# Patient Record
Sex: MALE
[De-identification: names, ages, dates, MRNs, and addresses within clinical notes are randomized per-mention and may not be internally consistent; named-entity substitution may affect disease eponyms.]

---

## 2017-09-21 ENCOUNTER — HOSPITAL ENCOUNTER (EMERGENCY)
Dept: HOSPITAL 31 - C.ER | Age: 27
Discharge: HOME | End: 2017-09-21
Payer: COMMERCIAL

## 2017-09-21 VITALS
RESPIRATION RATE: 16 BRPM | DIASTOLIC BLOOD PRESSURE: 64 MMHG | OXYGEN SATURATION: 100 % | SYSTOLIC BLOOD PRESSURE: 120 MMHG | TEMPERATURE: 98.5 F | HEART RATE: 75 BPM

## 2017-09-21 DIAGNOSIS — B35.3: Primary | ICD-10-CM

## 2017-09-21 NOTE — C.PDOC
History Of Present Illness


Patient complains of rash to both feet for few days, and 2 days ago scratched 

and picked at right foot and now complains of pain to area. He reports rash is 

itchy and at times burns, denies any fever or drainage. 


Time Seen by Provider: 09/21/17 11:37


Chief Complaint (Nursing): Abnormal Skin Integrity


History Per: Patient


History/Exam Limitations: no limitations


Onset/Duration Of Symptoms: Days


Current Symptoms Are (Timing): Still Present


Location Of Injury: Right: Foot, Left: Foot


Quality Of Symptoms: Painful, Itching.  denies: Swollen, Draining





Past Medical History


Reviewed: Historical Data, Nursing Documentation, Vital Signs


Vital Signs: 


 Last Vital Signs











Temp  98.5 F   09/21/17 11:21


 


Pulse  75   09/21/17 11:21


 


Resp  16   09/21/17 11:21


 


BP  120/64   09/21/17 11:21


 


Pulse Ox  100   09/21/17 12:05














- Medical History


PMH: No Chronic Diseases


Surgical History: No Surg Hx


Family History: States: Unknown Family Hx





- Social History


Hx Alcohol Use: Yes


Hx Substance Use: No





- Immunization History


Hx Tetanus Toxoid Vaccination: No


Hx Influenza Vaccination: No


Hx Pneumococcal Vaccination: No





Review Of Systems


Except As Marked, All Systems Reviewed And Found Negative.


Skin: Positive for: Rash





Physical Exam





- Physical Exam


Appears: Non-toxic, No Acute Distress


Skin: Warm, Dry, Rash (dry flaking and scaly rash to bilateral plantar surface. 

scab wound to right foot. )


Head: Atraumatic, Normacephalic


Eye(s): bilateral: Normal Inspection


Neck: Normal ROM


Chest: Symmetrical


Extremity: Normal ROM, No Tenderness, No Calf Tenderness, No Deformity, No 

Swelling


Pulses: Left Dorsalis Pedis: Normal, Right Dorsalis Pedis: Normal


Neurological/Psych: Oriented x3, Normal Speech





ED Course And Treatment


O2 Sat by Pulse Oximetry: 100





Medical Decision Making


Medical Decision Making: 


impression: tinea pedis


1153 spoke with podiatry resident who recommends prophylactic antibiotic and 

can follow up in the podiatry clinic on Monday. 


Bacitracin applied and Ibuprofen given for pain. Patient given verbal 

instructions for follow up.








Disposition


Counseled Patient/Family Regarding: Diagnosis, Need For Followup, Rx Given





- Disposition


Referrals: 


Podiatry Clinic [Outside]


Disposition: HOME/ ROUTINE


Disposition Time: 11:58


Condition: STABLE


Additional Instructions: 


Apply foot cream twice daily for 1-3 weeks. Take antibiotic twice a day. Follow 

up in the podiatry clinic on Monday


Prescriptions: 


Cephalexin [cephalexin] 500 mg PO Q12 #10 cap


Terbinafine HCl [Lamisil At] 30 gm TP BID #1 cream..g. NS


Instructions:  Tinea Pedis (ED)





- POA


Present On Arrival: None





- Clinical Impression


Clinical Impression: 


 Tinea pedis

## 2018-05-04 ENCOUNTER — HOSPITAL ENCOUNTER (EMERGENCY)
Dept: HOSPITAL 31 - C.ER | Age: 28
Discharge: HOME | End: 2018-05-04
Payer: COMMERCIAL

## 2018-05-04 VITALS
TEMPERATURE: 97.9 F | DIASTOLIC BLOOD PRESSURE: 76 MMHG | RESPIRATION RATE: 16 BRPM | HEART RATE: 50 BPM | OXYGEN SATURATION: 100 % | SYSTOLIC BLOOD PRESSURE: 129 MMHG
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
## 2018-05-04 DIAGNOSIS — Z23: ICD-10-CM

## 2018-05-04 DIAGNOSIS — S01.01XA: Primary | ICD-10-CM

## 2018-05-04 NOTE — C.PDOC
History Of Present Illness


28 year old male presents to the ED for evaluation of a laceration to the top 

of his scalp. Patient states that while at work patient cut the top of his 

scalp with the corner of a wooden box. Patient denies LOC, headache, visual 

changes, weakness, numbness. 


Time Seen by Provider: 05/04/18 13:34


Chief Complaint (Nursing): Abnormal Skin Integrity


History Per: Patient


History/Exam Limitations: no limitations


Onset/Duration Of Symptoms: Hrs


Current Symptoms Are (Timing): Still Present


Location Of Injury: Right: Head, Left: Head


Quality Of Symptoms: Painful


Recent travel outside of the [place]: No


Additional History Per: Patient





Past Medical History


Reviewed: Historical Data, Nursing Documentation, Vital Signs


Vital Signs: 


 Last Vital Signs











Temp  97.9 F   05/04/18 13:27


 


Pulse  50 L  05/04/18 13:27


 


Resp  16   05/04/18 13:27


 


BP  129/76   05/04/18 13:27


 


Pulse Ox  100   05/04/18 14:12














- Medical History


PMH: No Chronic Diseases


Surgical History: No Surg Hx


Family History: States: Unknown Family Hx





- Social History


Hx Alcohol Use: Yes


Hx Substance Use: No





- Immunization History


Hx Tetanus Toxoid Vaccination: No


Hx Influenza Vaccination: No


Hx Pneumococcal Vaccination: No





Review Of Systems


Except As Marked, All Systems Reviewed And Found Negative.


Eyes: Negative for: Vision Change


Skin: Positive for: Other (Laceration )





Physical Exam





- Physical Exam


Appears: Non-toxic, No Acute Distress


Skin: Normal Color, Warm, Dry


Head: Normacephalic, Laceration (2 cm to the top of his scalp)


Eye(s): bilateral: Normal Inspection, PERRL, EOMI


Nose: No Discharge


Oral Mucosa: Moist


Neck: Normal ROM, No Midline Cervical Tenderness, Supple


Chest: Symmetrical


Cardiovascular: Rhythm Regular, No Murmur


Extremity: Normal ROM


Neurological/Psych: Oriented x3, Normal Speech, Normal Cognition, Normal Motor, 

Normal Sensation


Gait: Steady





ED Course And Treatment


O2 Sat by Pulse Oximetry: 100 (ON RA)


Pulse Ox Interpretation: Normal





Laceration





- Laceration Repair


  ** top of scalp


Wound Length (In cm): 2


Description Of Wound: Linear


Anesthesia: Lidocaine 1%


Wound Examination: Irrigated With Saline, No FB With Wound Exploration


Wound Closure: Staples


Wound Complexity: Simple





Medical Decision Making


Medical Decision Making: 


Plan:


* tetanus UTD


* Lidocaine


3 staples placed 1 week f/u. 





Disposition





- Disposition


Referrals: 


[facility] [Outside]


[facility] [Outside]


Disposition: HOME/ ROUTINE


Disposition Time: 02:00


Condition: STABLE


Additional Instructions: 


return in 7 days or follow up with your doctor for removal. return immediately 

with any worsening symptoms or concerns. 


Instructions:  Laceration Repair With Staples (DC)


Forms:  [facility] (English)





- Clinical Impression


Clinical Impression: 


 Laceration








- Scribe Statement


The provider has reviewed the documentation as recorded by the Scribe


Rosendo Terry





All medical record entries made by the Scribe were at my direction and 

personally dictated by me. I have reviewed the chart and agree that the record 

accurately reflects my personal performance of the history, physical exam, 

medical decision making, and the department course for this patient. I have 

also personally directed, reviewed, and agree with the discharge instructions 

and disposition.

## 2018-05-15 ENCOUNTER — HOSPITAL ENCOUNTER (EMERGENCY)
Dept: HOSPITAL 31 - C.ER | Age: 28
Discharge: HOME | End: 2018-05-15
Payer: COMMERCIAL

## 2018-05-15 VITALS
TEMPERATURE: 97.7 F | SYSTOLIC BLOOD PRESSURE: 107 MMHG | RESPIRATION RATE: 20 BRPM | DIASTOLIC BLOOD PRESSURE: 61 MMHG | HEART RATE: 51 BPM | OXYGEN SATURATION: 96 %
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
## 2018-05-15 DIAGNOSIS — L03.011: ICD-10-CM

## 2018-05-15 DIAGNOSIS — S60.452A: Primary | ICD-10-CM

## 2018-05-15 PROCEDURE — 99284 EMERGENCY DEPT VISIT MOD MDM: CPT

## 2018-05-15 PROCEDURE — 73140 X-RAY EXAM OF FINGER(S): CPT

## 2018-05-15 PROCEDURE — 96365 THER/PROPH/DIAG IV INF INIT: CPT

## 2018-05-15 NOTE — RAD
PROCEDURE:  Right middle finger radiographs.



HISTORY:

infection



COMPARISON:

None.



TECHNIQUE:

AP radiograph of the right hand, as well as spot oblique and lateral 

images of right middle finger were obtained.



FINDINGS:



RIGHT MIDDLE FINGER:

Right middle finger normal, without fracture of focal lesion. 

Remainder of the right hand (as seen on the AP view) grossly 

unremarkable.



JOINTS:

Normal. 



SOFT TISSUES:

There is mild soft tissue swelling noted.  There is no radiopaque 

foreign body. 



OTHER FINDINGS:

None.



IMPRESSION:

No acute fracture. No evidence of osteomyelitis.

## 2018-05-15 NOTE — C.PDOC
History Of Present Illness


27yo male, presents to ED for evaluation of pain and swelling to his right 3rd 

digit since 2 days. Patient states 2 weeks ago, while buying lumber he got a 

splinter to his right middle finger; patient states he took the splinter out 

and was doing ok however, he noticed since the past 2 days he has had pain and 

swelling to that finger. He rates the pain 8/10 and reports using bacitracin 

ointment with no relief. Patient states the pain worsens with movement and 

palpation of finger; pain is non-radiating. He denies any fever, chills, 

weakness or numbness to the finger. No other medical complaints.





PMD: None


Time Seen by Provider: 05/15/18 09:07


Chief Complaint (Nursing): Finger,Hand,&Wrist


History Per: Patient


History/Exam Limitations: no limitations


Onset/Duration Of Symptoms: Days (2)


Current Symptoms Are (Timing): Still Present


Quality: Dull, "Pain"


Pain Scale Rating Of: 8


Exacerbating Factor(s): Strenuous Use Of Affected Area





Past Medical History


Reviewed: Historical Data, Nursing Documentation, Vital Signs


Vital Signs: 


 Last Vital Signs











Temp  97.7 F   05/15/18 10:26


 


Pulse  51 L  05/15/18 10:26


 


Resp  20   05/15/18 10:26


 


BP  107/61   05/15/18 10:26


 


Pulse Ox  96   05/15/18 11:57














- Medical History


PMH: No Chronic Diseases


Surgical History: No Surg Hx


Family History: States: No Known Family Hx, Unknown Family Hx





- Social History


Hx Alcohol Use: Yes


Hx Substance Use: No





- Immunization History


Hx Tetanus Toxoid Vaccination: No


Hx Influenza Vaccination: No


Hx Pneumococcal Vaccination: No





Review Of Systems


Except As Marked, All Systems Reviewed And Found Negative.


Constitutional: Negative for: Fever, Chills


Musculoskeletal: Positive for: Other (right 3rd digit pain and swelling)


Neurological: Negative for: Weakness, Numbness





Physical Exam





- Physical Exam


Appears: Non-toxic, No Acute Distress


Skin: Normal Color, Warm, Dry


Head: Atraumatic, Normacephalic


Eye(s): bilateral: Normal Inspection, PERRL


Neck: Supple


Chest: Symmetrical


Cardiovascular: Rhythm Regular


Respiratory: Normal Breath Sounds


Extremity: No Normal ROM (decreased ROM of right 3rd digit due to pain), 

Tenderness (right 3rd digit tender to palpation), Swelling (swelling noted to 

right 3rd digit), Other (erythema to tip of right 3rd digit)


Neurological/Psych: Oriented x3





ED Course And Treatment


O2 Sat by Pulse Oximetry: 96 (RA)


Pulse Ox Interpretation: Normal





Medical Decision Making


Medical Decision Making: 


Impression: Right 3rd digit pain and swelling


Plan:


-- XR Right 3rd digit


-- Motrin 600mg PO


-- Tylenol 975mg PO


-- Ancef 1g IV





Time: 0928


XR RIght 3rd digit FINDINGS:





RIGHT MIDDLE FINGER:


Right middle finger normal, without fracture of focal lesion. Remainder of the 

right hand (as seen on the AP view) grossly unremarkable.





JOINTS:


Normal. 





SOFT TISSUES:


There is mild soft tissue swelling noted.  There is no radiopaque foreign body. 





OTHER FINDINGS:


None.





IMPRESSION:


No acute fracture. No evidence of osteomyelitis.





Time: 1109


Patient reports improvement in pain and is stable for discharge home. Patient 

given prescription for antibiotics and told to follow up in clinic for a hand 

specialist referral.





Disposition


Counseled Patient/Family Regarding: Studies Performed, Diagnosis, Need For 

Followup, Rx Given





- Disposition


Referrals: 


Courtney Garrett MD [Staff Provider] - 


[facility] [Outside]


Disposition: HOME/ ROUTINE


Disposition Time: 11:09


Condition: STABLE


Prescriptions: 


Ibuprofen [Motrin] 600 mg PO TID #15 tab


Penicillin VK [Pen-Vee K] 2 tab PO BID #28 tab


traMADol/Acetaminophen [Ultracet 37.5/325 mg] 1 tab PO TID PRN #15 tab


 PRN Reason: pain


Forms:  [facility] (English)





- POA


Present On Arrival: None





- Clinical Impression


Clinical Impression: 


 Splinter in skin, Cellulitis of finger of right hand








- Scribe Statement


The provider has reviewed the documentation as recorded by the Scribe (Ana Maria Mcclellan)


Provider Attestation: 


All medical record entries made by the Scribe were at my direction and 

personally dictated by me. I have reviewed the chart and agree that the record 

accurately reflects my personal performance of the history, physical exam, 

medical decision making, and the department course for this patient. I have 

also personally directed, reviewed, and agree with the discharge instructions 

and disposition.

## 2018-05-17 ENCOUNTER — HOSPITAL ENCOUNTER (EMERGENCY)
Dept: HOSPITAL 31 - C.ER | Age: 28
Discharge: HOME | End: 2018-05-17
Payer: SELF-PAY

## 2018-05-17 VITALS
SYSTOLIC BLOOD PRESSURE: 114 MMHG | DIASTOLIC BLOOD PRESSURE: 64 MMHG | OXYGEN SATURATION: 98 % | RESPIRATION RATE: 20 BRPM | HEART RATE: 60 BPM | TEMPERATURE: 98.6 F

## 2018-05-17 DIAGNOSIS — M79.644: ICD-10-CM

## 2018-05-17 DIAGNOSIS — L03.011: Primary | ICD-10-CM

## 2018-05-17 NOTE — CT
EXAM:

  CT Right Upper Extremity Without Intravenous Contrast, Hand



EXAM DATE/TIME:

  5/17/2018 8:41 PM



CLINICAL HISTORY:

  28 years old, male; Signs and symptoms; Swelling; Fingers; Right; Additional 

info: Mod pain, swelling, puncture wound volar, r 3rd fing



TECHNIQUE:

  Axial computed tomography images of the right hand without intravenous 

contrast.  All CT scans at this facility use one or more dose reduction 

techniques, viz.: automated exposure control; ma/kV adjustment per patient size 

(including targeted exams where dose is matched to indication; i.e. head); or 

iterative reconstruction technique.

  Coronal and sagittal reformatted images were created and reviewed.



COMPARISON:

  No relevant prior studies available.



FINDINGS:

  BONES/JOINTS: No acute fractures visualized. Bony alignment is within normal 

limits.   No evidence of lytic, destructive bony changes or aggressive 

periosteal reaction to suggest osteomyelitis.

  SOFT TISSUES:  Diffuse soft tissue swelling and subcutaneous fat infiltration 

involving the middle finger soft tissues distally, at the level of the middle 

and distal phalanges.  No evidence of a focal, measurable soft tissue fluid 

collection/abscess.  No definite radioopaque and foreign bodies identified. No 

evidence of soft tissue hematoma.



IMPRESSION:     

- Soft tissue swelling involving the middle finger distally. 

- No evidence of focal abscess, radiopaque foreign body, or soft tissue gas.

- No acute bony abnormality identified.

- See above for remaining findings.

## 2018-05-17 NOTE — C.PDOC
History Of Present Illness


The patient is a 28 year old male who was evaluated in this ED on 5/15 for 

complaints of right finger pain and swelling s/p puncture wound injury which he 

sustained two weeks prior. Patient underwent XR which showed no evidence of 

foreign body, was prescribed antibiotics and advised to follow up with hand 

doctor. Patient states the pain has worsened today and presents for re-

evaluation. Patient denies fever, chills, or drainage from the area. 


Time Seen by Provider: 05/17/18 19:56


Chief Complaint (Nursing): Upper Extremity Problem/Injury


History Per: Patient


History/Exam Limitations: no limitations


Onset/Duration Of Symptoms: Days


Current Symptoms Are (Timing): Worse


Quality: "Pain"


Additional History Per: Patient





Past Medical History


Reviewed: Historical Data, Nursing Documentation, Vital Signs


Vital Signs: 


 Last Vital Signs











Temp  98.6 F   05/17/18 19:33


 


Pulse  60   05/17/18 19:33


 


Resp  20   05/17/18 19:33


 


BP  114/64   05/17/18 19:33


 


Pulse Ox  98   05/18/18 02:26














- Medical History


PMH: No Chronic Diseases


Surgical History: No Surg Hx


Family History: States: Unknown Family Hx





- Social History


Hx Alcohol Use: Yes


Hx Substance Use: No





- Immunization History


Hx Tetanus Toxoid Vaccination: Yes


Hx Influenza Vaccination: No


Hx Pneumococcal Vaccination: No





Review Of Systems


Constitutional: Negative for: Fever, Chills


Musculoskeletal: Positive for: Other (right finger pain and swelling. no 

drainage )





Physical Exam





- Physical Exam


Appears: Non-toxic, No Acute Distress


Skin: Normal Color, Warm, Dry, Other (small open wound to distal aspect of 

right 3rd DIP. no apparent fluctuance or purulent drainage noted. no erythema  )


Extremity: Tenderness (right 3rd finger ), Capillary Refill (less than 2 

seconds ), Swelling (right 3rd finger ), Other (ROM of finger intact, with pain 

)


Neurological/Psych: Oriented x3, Normal Speech, Normal Cognition, Normal Motor, 

Normal Sensation





ED Course And Treatment


O2 Sat by Pulse Oximetry: 98 (on RA)


Pulse Ox Interpretation: Normal





- CT Scan/US


  ** CT right upper extremity 


Other Rad Studies (CT/US): Read By Radiologist, Radiology Report Reviewed


CT/US Interpretation: EXAM:  CT Right Upper Extremity Without Intravenous 

Contrast, Hand.  EXAM DATE/TIME:  5/17/2018 8:41 PM.  CLINICAL HISTORY:  28 

years old, male; Signs and symptoms; Swelling; Fingers; Right; Additional info: 

Mod pain,.  swelling, puncture wound volar, r 3rd fing.  TECHNIQUE:  Axial 

computed tomography images of the right hand without intravenous contrast. All 

CT scans at.  this facility use one or more dose reduction techniques, viz.: 

automated exposure control; ma/kV.  adjustment per patient size (including 

targeted exams where dose is matched to indication; i.e. head);.  or iterative 

reconstruction technique.  Coronal and sagittal reformatted images were created 

and reviewed.  COMPARISON:  No relevant prior studies available.  FINDINGS:  

BONES/JOINTS: No acute fractures visualized. Bony alignment is within normal 

limits. No evidence.  of lytic, destructive bony changes or aggressive 

periosteal reaction to suggest osteomyelitis.  SOFT TISSUES: Diffuse soft 

tissue swelling and subcutaneous fat infiltration involving the middle.  finger 

soft tissues distally, at the level of the middle and distal phalanges. No 

evidence of a focal,.  measurable soft tissue fluid collection/abscess. No 

definite radioopaque and foreign bodies.  identified. No evidence of soft 

tissue hematoma.  IMPRESSION:  - Soft tissue swelling involving the middle 

finger distally.  - No evidence of focal abscess, radiopaque foreign body, or 

soft tissue gas.  - No acute bony abnormality identified.  - See above for 

remaining findings.


Progress Note: Based on patient's complaint of worsening pain since his 

previous visits, a CT of right upper extremity ordered in order to rule out 

abscess or tenosynovitis. Patient is refusing pain medication at this time.  On 

re-exam, patient is resting comfortably, showing no signs of distress and is 

stable for discharge. Patient is advised to continue antibiotics prescribed 

from his last visit and to follow up with hand surgeon on 6/5 as scheduled. 

Explained to patient that he must return if concerning symptoms arise, which 

include but are not limited to: fever, purulent discharge, numbness/weakness, 

or discoloration to the area. Patient verbalizes understanding and is stable 

for discharge.





Disposition


Counseled Patient/Family Regarding: Diagnosis, Need For Followup, Rx Given





- Disposition


Referrals: 


Courtney Garrett MD [Staff Provider] - 


Disposition: HOME/ ROUTINE


Disposition Time: 22:38


Condition: STABLE


Additional Instructions: 


Keep splint for support





Keep arm elvated





May apply warm compress/ and antibacterial oint





Keep hand clean/ wound care as instructed





Keep appointment with Hand surgeon





Continue antibiotics as prescribed





Return to ER if worse 


Instructions:  Cellulitis (Skin Infection), Adult (DC)


Forms:  [facility] (English)





- Clinical Impression


Clinical Impression: 


 Cellulitis of finger of right hand, Finger pain, right








- PA / NP / Resident Statement


MD/DO has reviewed & agrees with the documentation as recorded.





- Scribe Statement


The provider has reviewed the documentation as recorded by the Scribe (Susan Vazquez)








All medical record entries made by the Scribe were at my direction and 

personally dictated by me. I have reviewed the chart and agree that the record 

accurately reflects my personal performance of the history, physical exam, 

medical decision making, and the department course for this patient. I have 

also personally directed, reviewed, and agree with the discharge instructions 

and disposition.

## 2019-01-28 ENCOUNTER — HOSPITAL ENCOUNTER (EMERGENCY)
Dept: HOSPITAL 31 - C.ER | Age: 29
Discharge: HOME | End: 2019-01-28
Payer: COMMERCIAL

## 2019-01-28 ENCOUNTER — HOSPITAL ENCOUNTER (EMERGENCY)
Dept: HOSPITAL 14 - H.ER | Age: 29
LOS: 1 days | Discharge: HOME | End: 2019-01-29
Payer: COMMERCIAL

## 2019-01-28 VITALS
RESPIRATION RATE: 20 BRPM | OXYGEN SATURATION: 99 % | TEMPERATURE: 98 F | HEART RATE: 55 BPM | SYSTOLIC BLOOD PRESSURE: 122 MMHG | DIASTOLIC BLOOD PRESSURE: 72 MMHG

## 2019-01-28 VITALS — RESPIRATION RATE: 18 BRPM

## 2019-01-28 DIAGNOSIS — L02.413: Primary | ICD-10-CM

## 2019-01-28 DIAGNOSIS — L98.8: Primary | ICD-10-CM

## 2019-01-28 LAB
ALBUMIN SERPL-MCNC: 4.3 G/DL (ref 3.5–5)
ALBUMIN/GLOB SERPL: 1.3 {RATIO} (ref 1–2.1)
ALT SERPL-CCNC: 27 U/L (ref 21–72)
AST SERPL-CCNC: 25 U/L (ref 17–59)
BASOPHILS # BLD AUTO: 0 K/UL (ref 0–0.2)
BASOPHILS NFR BLD: 0.3 % (ref 0–2)
BUN SERPL-MCNC: 18 MG/DL (ref 9–20)
CALCIUM SERPL-MCNC: 9.6 MG/DL (ref 8.4–10.2)
EOSINOPHIL # BLD AUTO: 1.3 K/UL (ref 0–0.7)
EOSINOPHIL NFR BLD: 9.6 % (ref 0–4)
ERYTHROCYTE [DISTWIDTH] IN BLOOD BY AUTOMATED COUNT: 13.8 % (ref 11.5–14.5)
GFR NON-AFRICAN AMERICAN: > 60
HGB BLD-MCNC: 15.2 G/DL (ref 12–18)
LYMPHOCYTES # BLD AUTO: 1.7 K/UL (ref 1–4.3)
LYMPHOCYTES NFR BLD AUTO: 13.1 % (ref 20–40)
MCH RBC QN AUTO: 29.2 PG (ref 27–31)
MCHC RBC AUTO-ENTMCNC: 33.4 G/DL (ref 33–37)
MCV RBC AUTO: 87.3 FL (ref 80–94)
MONOCYTES # BLD: 0.9 K/UL (ref 0–0.8)
MONOCYTES NFR BLD: 7 % (ref 0–10)
NEUTROPHILS # BLD: 9.3 K/UL (ref 1.8–7)
NEUTROPHILS NFR BLD AUTO: 70 % (ref 50–75)
NRBC BLD AUTO-RTO: 0 % (ref 0–0)
PLATELET # BLD: 152 K/UL (ref 130–400)
PMV BLD AUTO: 10.3 FL (ref 7.2–11.7)
RBC # BLD AUTO: 5.21 MIL/UL (ref 4.4–5.9)
WBC # BLD AUTO: 13.3 K/UL (ref 4.8–10.8)

## 2019-01-28 PROCEDURE — 85025 COMPLETE CBC W/AUTO DIFF WBC: CPT

## 2019-01-28 PROCEDURE — 10160 PNXR ASPIR ABSC HMTMA BULLA: CPT

## 2019-01-28 PROCEDURE — 80053 COMPREHEN METABOLIC PANEL: CPT

## 2019-01-28 PROCEDURE — 87040 BLOOD CULTURE FOR BACTERIA: CPT

## 2019-01-28 PROCEDURE — 90715 TDAP VACCINE 7 YRS/> IM: CPT

## 2019-01-28 PROCEDURE — 90471 IMMUNIZATION ADMIN: CPT

## 2019-01-28 PROCEDURE — 99283 EMERGENCY DEPT VISIT LOW MDM: CPT

## 2019-01-28 PROCEDURE — 96374 THER/PROPH/DIAG INJ IV PUSH: CPT

## 2019-01-28 NOTE — ED PDOC
Upper Extremity Pain/Injury


Time Seen by Provider: 01/28/19 21:24


Chief Complaint (Nursing): Abnormal Skin Integrity


Chief Complaint (Provider): Pain to right forearm


History Per: Patient


History/Exam Limitations: no limitations


Onset/Duration Of Symptoms: Days


Current Symptoms Are (Timing): Still Present


Quality: "Pain"


Additional History Per: Patient


Additional Complaint(s): 


29yo male, states for the past 1 week, he has had a painful mass to his right 

forearm. He reports he was seen at [facility] earlier today for the same 

complaint and was prescribed antibiotics, which he has not taken. He reports 

coming to the ER as the swelling and redness around the wound has worsened. 

Patient states he has been taking Tylenol, with last dose yesterday and minimal 

relief. He otherwise denies any fever, chills and offers no additional 

complaints.





Past Medical History


Reviewed: Historical Data, Nursing Documentation, Vital Signs


Vital Signs: 





                                Last Vital Signs











Temp  98.2 F   01/28/19 20:22


 


Pulse  68   01/28/19 20:22


 


Resp  18   01/28/19 20:22


 


BP  114/60   01/28/19 20:22


 


Pulse Ox  99   01/28/19 20:22














- Medical History


PMH: No Chronic Diseases





- Surgical History


Surgical History: No Surg Hx





- Family History


Family History: States: No Known Family Hx





- Immunization History


Hx Tetanus Toxoid Vaccination: Yes


Hx Influenza Vaccination: No


Hx Pneumococcal Vaccination: No





- Home Medications


Home Medications: 


                                Ambulatory Orders











 Medication  Instructions  Recorded


 


Cephalexin [cephalexin] 500 mg PO Q6 #28 cap 01/28/19


 


Sulfamethoxazole/Trimethoprim 2 tab PO BID #28 tab 01/28/19





[Bactrim  mg-160 mg]  














- Allergies


Allergies/Adverse Reactions: 


                                    Allergies











Allergy/AdvReac Type Severity Reaction Status Date / Time


 


No Known Allergies Allergy   Verified 01/28/19 20:22














Review of Systems


ROS Statement: Except As Marked, All Systems Reviewed And Found Negative


Constitutional: Negative for: Fever, Chills


Musculoskeletal: Positive for: Other (painful mass to right forearm)





Physical Exam





- Reviewed


Nursing Documentation Reviewed: Yes


Vital Signs Reviewed: Yes





- Physical Exam


Appears: Positive for: Non-toxic, No Acute Distress


Head Exam: Positive for: NORMAL INSPECTION


Skin: Positive for: Normal Color


Neck: Positive for: Supple


Cardiovascular/Chest: Positive for: Regular Rate, Rhythm


Respiratory: Positive for: Normal Breath Sounds


Pulses-Radial (L): 2+


Pulses-Radial (R): 2+


Extremity: Positive for: Normal ROM (FROM bilateral upper extremities), Other 

(right forearm with a quarter sized, circular, indurated and non-fluctuant mass 

with moderate surrounding erythema. no streaking noted.)


Neurologic/Psych: Positive for: Alert, Oriented.  Negative for: Motor/Sensory 

Deficits





- Laboratory Results


Result Diagrams: 


                                 01/28/19 22:45





                                 01/28/19 22:45





- ECG


O2 Sat by Pulse Oximetry: 99 (RA)


Pulse Ox Interpretation: Normal





Medical Decision Making


Medical Decision Making: 


Impression: Abscess to right forearm


Plan:


-- Patient informed the abscess is still firm and an I&D is not indicated at 

this time


-- Labs


-- Blood culture


-- IV Vancomycin


-- IV Zosyn


-- IV toradol 





2200


Under sterile conditions mass was aspirated but no purulent material was 

produced. DSD applied. Bleeding controlled. I&D not indicated at this time. Pt. 

agrees with plan and care.





2300


On re-evaluation, pt. informed of results. States pain has improved. Instructed 

to solely use newly prescribed meds as Bactrim DS that was initially prescribed 

was not for MRSA dosing. Advised to return to ED in 48 hours for wound check but

is to return to ED immediately if symptoms worsen. Pt. and family verbalized 

correct understanding of necessary f/u. 





Scribe Attestation:


Documented by Maryann Bryant, acting as a scribe for JAMES Smith.





Provider Scribe Attestation:


All medical record entries made by the Scribe were at my direction and 

personally dictated by me. I have reviewed the chart and agree that the record 

accurately reflects my personal performance of the history, physical exam, 

medical decision making, and the department course for this patient. I have also

personally directed, reviewed, and agree with the discharge instructions and 

disposition.








Disposition





- Clinical Impression


Clinical Impression: 


 Cellulitis, Abscess








- Patient ED Disposition


Is Patient to be Admitted: No





- Disposition


Referrals: 


[facility] [Outside]


Disposition: Routine/Home


Disposition Time: 23:00


Condition: IMPROVED


Additional Instructions: 


ONLY TAKE THE MEDICATIONS I PRESCRIBED TO YOU


RETURN TO ED IN 48 HOURS FOR WOUND CHECK


RETURN TO ED IMMEDIATELY IF FEVER DEVELOPS OR REDNESS/SWELLING WORSENS





BRETT HODGES, thank you for letting us take care of you today. 

Your provider was Yulissa Thrasher MD and you were treated for RT ARM PAIN. The 

emergency medical care you received today was directed at your acute symptoms. 

If you were prescribed any medication, please fill it and take as directed. It 

may take several days for your symptoms to resolve. Return to the Emergency 

Department if your symptoms worsen, do not improve, or if you have any other 

problems.





Please contact your doctor or call one of the physicians/clinics you have been 

referred to that are listed on the Patient Visit Information form that is 

included in your discharge packet. Bring any paperwork you were given at [facility] with you along with any medications you are taking to your follow up 

visit. Our treatment cannot replace ongoing medical care by a primary care 

provider outside of the emergency department.





Thank you for allowing the [facility] team to be part of your care today.








If you had an X-Ray or CT scan: A Radiologist will review the ED reading if any 

change in treatment is needed we will contact you.***





If you had a blood, urine, or wound culture: It will take several days for the 

results, if any change in treatment is needed we will contact you.***





If you had an STI test: It will take 48 hours for the results. Please call after

 1 week if you have not heard back.***


Prescriptions: 


Cephalexin [cephalexin] 500 mg PO Q6 #28 cap


Sulfamethoxazole/Trimethoprim [Bactrim  mg-160 mg] 2 tab PO BID #28 tab


Instructions:  Boil (DC), Cellulitis (Skin Infection), Adult (DC)


Forms:  [facility] (English)

## 2019-01-28 NOTE — C.PDOC
History Of Present Illness


28 year old male presents to the ED for evaluation of a small lesion to right 

medial elbow area. Patient reports the area started as a small pustule for a few

days, and then erupted and started expressing serosanguinous fluid after he had 

been picking the area for a few days. Patient denies fever, chills, recent 

trauma. 


Time Seen by Provider: 01/28/19 13:22


Chief Complaint (Nursing): Abnormal Skin Integrity


History Per: Patient


History/Exam Limitations: no limitations


Onset/Duration Of Symptoms: Days


Current Symptoms Are (Timing): Still Present


Quality Of Symptoms: Draining


Additional History Per: Patient





Past Medical History


Vital Signs: 





                                Last Vital Signs











Temp  98 F   01/28/19 13:07


 


Pulse  55 L  01/28/19 13:07


 


Resp  20   01/28/19 13:07


 


BP  122/72   01/28/19 13:07


 


Pulse Ox  99   01/28/19 13:07











Family History: States: Unknown Family Hx





- Social History


Hx Alcohol Use: Yes


Hx Substance Use: No





- Immunization History


Hx Tetanus Toxoid Vaccination: Yes


Hx Influenza Vaccination: No


Hx Pneumococcal Vaccination: No





Review Of Systems


Constitutional: Negative for: Fever, Chills


Skin: Positive for: Lesions (one, right medial elbow area )





Physical Exam





- Physical Exam


Appears: Non-toxic, No Acute Distress


Skin: Normal Color, Warm, Dry, Other (small, 1x1cm area of questionable 

fluctuance with pus-like bloody discahrge. no surrounding erythema )


Head: Atraumatic, Normacephalic


Eye(s): bilateral: Normal Inspection


Extremity: Normal ROM, No Tenderness, Capillary Refill (less than 2 seconds ), 

No Swelling


Neurological/Psych: Oriented x3, Normal Speech, Normal Cognition





ED Course And Treatment


O2 Sat by Pulse Oximetry: 99 (on RA)


Pulse Ox Interpretation: Normal


Progress Note: Motrin PO and Bactrim PO given.





Medical Decision Making


Medical Decision Making: 





1x1 sm small abscess/wound, no opened to surface and draining


defer I&D for risk/benefit of further structural damage and minimal fluctuance.





Bactrim for consideration of ? MRSA








Disposition


Doctor Will See Patient In The: Office


Counseled Patient/Family Regarding: Studies Performed, Diagnosis





- Disposition


Referrals: 


[facility] [Outside]


[facility] [Outside]


[facility] [Outside]


Disposition: HOME/ ROUTINE


Disposition Time: 13:28


Condition: GOOD


Additional Instructions: 


compressas tibias 1/2 hora por hora


NO LO OPRIMAS, SE HACE PEOR








Bactrim DS (antibiotico) 1 tableta 2 veces al jamia por 5 john en total





Ibuprofeno/advil 400-600 mg cada 6 horas wilfredo necessario para dolor.





Sigue en la [facility] o' regressa wilfredo necessario


Prescriptions: 


Sulfamethoxazole/Trimethoprim [Bactrim  mg-160 mg] 1 tab PO BID #9 tab


Instructions:  Skin Abscess


Forms:  [employer] (English)


Print Language: Mexican





- Clinical Impression


Clinical Impression: 


 Skin lesion








- Scribe Statement


The provider has reviewed the documentation as recorded by the Scribe (Susan Vazquez)


Provider Attestation: 








All medical record entries made by the Scribe were at my direction and 

personally dictated by me. I have reviewed the chart and agree that the record 

accurately reflects my personal performance of the history, physical exam, 

medical decision making, and the department course for this patient. I have also

personally directed, reviewed, and agree with the discharge instructions and 

disposition.

## 2019-01-29 ENCOUNTER — HOSPITAL ENCOUNTER (OUTPATIENT)
Dept: HOSPITAL 14 - H.ER | Age: 29
Setting detail: OBSERVATION
LOS: 2 days | Discharge: HOME | End: 2019-01-31
Payer: COMMERCIAL

## 2019-01-29 VITALS
HEART RATE: 66 BPM | OXYGEN SATURATION: 100 % | SYSTOLIC BLOOD PRESSURE: 118 MMHG | TEMPERATURE: 98 F | DIASTOLIC BLOOD PRESSURE: 62 MMHG

## 2019-01-29 DIAGNOSIS — Z23: ICD-10-CM

## 2019-01-29 DIAGNOSIS — B95.61: ICD-10-CM

## 2019-01-29 DIAGNOSIS — L03.113: ICD-10-CM

## 2019-01-29 DIAGNOSIS — L02.413: Primary | ICD-10-CM

## 2019-01-29 DIAGNOSIS — F17.210: ICD-10-CM

## 2019-01-29 PROCEDURE — 90674 CCIIV4 VAC NO PRSV 0.5 ML IM: CPT

## 2019-01-29 PROCEDURE — 80053 COMPREHEN METABOLIC PANEL: CPT

## 2019-01-29 PROCEDURE — 90471 IMMUNIZATION ADMIN: CPT

## 2019-01-29 PROCEDURE — 99285 EMERGENCY DEPT VISIT HI MDM: CPT

## 2019-01-29 PROCEDURE — 82803 BLOOD GASES ANY COMBINATION: CPT

## 2019-01-29 PROCEDURE — 81003 URINALYSIS AUTO W/O SCOPE: CPT

## 2019-01-29 PROCEDURE — 85025 COMPLETE CBC W/AUTO DIFF WBC: CPT

## 2019-01-29 PROCEDURE — 10060 I&D ABSCESS SIMPLE/SINGLE: CPT

## 2019-01-29 PROCEDURE — 87086 URINE CULTURE/COLONY COUNT: CPT

## 2019-01-29 PROCEDURE — 87070 CULTURE OTHR SPECIMN AEROBIC: CPT

## 2019-01-29 PROCEDURE — 93005 ELECTROCARDIOGRAM TRACING: CPT

## 2019-01-29 PROCEDURE — 87040 BLOOD CULTURE FOR BACTERIA: CPT

## 2019-01-29 PROCEDURE — 83036 HEMOGLOBIN GLYCOSYLATED A1C: CPT

## 2019-01-29 PROCEDURE — 36415 COLL VENOUS BLD VENIPUNCTURE: CPT

## 2019-01-29 PROCEDURE — 71045 X-RAY EXAM CHEST 1 VIEW: CPT

## 2019-01-29 PROCEDURE — 85027 COMPLETE CBC AUTOMATED: CPT

## 2019-01-30 LAB
ALBUMIN SERPL-MCNC: 4.3 G/DL (ref 3.5–5)
ALBUMIN/GLOB SERPL: 1.3 {RATIO} (ref 1–2.1)
ALT SERPL-CCNC: 24 U/L (ref 21–72)
AST SERPL-CCNC: 25 U/L (ref 17–59)
BACTERIA #/AREA URNS HPF: (no result) /[HPF]
BASE EXCESS BLDV CALC-SCNC: -0.3 MMOL/L (ref 0–2)
BASE EXCESS BLDV CALC-SCNC: -2.9 MMOL/L (ref 0–2)
BASOPHILS # BLD AUTO: 0.1 K/UL (ref 0–0.2)
BASOPHILS NFR BLD: 0.5 % (ref 0–2)
BILIRUB UR-MCNC: NEGATIVE MG/DL
BUN SERPL-MCNC: 12 MG/DL (ref 9–20)
CALCIUM SERPL-MCNC: 9.7 MG/DL (ref 8.4–10.2)
COLOR UR: YELLOW
EOSINOPHIL # BLD AUTO: 0.7 K/UL (ref 0–0.7)
EOSINOPHIL NFR BLD: 6.2 % (ref 0–4)
ERYTHROCYTE [DISTWIDTH] IN BLOOD BY AUTOMATED COUNT: 13.6 % (ref 11.5–14.5)
ERYTHROCYTE [DISTWIDTH] IN BLOOD BY AUTOMATED COUNT: 13.9 % (ref 11.5–14.5)
GFR NON-AFRICAN AMERICAN: > 60
GLUCOSE UR STRIP-MCNC: (no result) MG/DL
HGB BLD-MCNC: 13.6 G/DL (ref 12–18)
HGB BLD-MCNC: 14.5 G/DL (ref 12–18)
LEUKOCYTE ESTERASE UR-ACNC: (no result) LEU/UL
LYMPHOCYTES # BLD AUTO: 1.2 K/UL (ref 1–4.3)
LYMPHOCYTES NFR BLD AUTO: 10.5 % (ref 20–40)
MCH RBC QN AUTO: 28.5 PG (ref 27–31)
MCH RBC QN AUTO: 28.9 PG (ref 27–31)
MCHC RBC AUTO-ENTMCNC: 33 G/DL (ref 33–37)
MCHC RBC AUTO-ENTMCNC: 33.2 G/DL (ref 33–37)
MCV RBC AUTO: 86.5 FL (ref 80–94)
MCV RBC AUTO: 87.1 FL (ref 80–94)
MONOCYTES # BLD: 1 K/UL (ref 0–0.8)
MONOCYTES NFR BLD: 8.5 % (ref 0–10)
NEUTROPHILS # BLD: 8.7 K/UL (ref 1.8–7)
NEUTROPHILS NFR BLD AUTO: 74.3 % (ref 50–75)
NRBC BLD AUTO-RTO: 0 % (ref 0–0)
PCO2 BLDV: 38 MMHG (ref 40–60)
PCO2 BLDV: 41 MMHG (ref 40–60)
PH BLDV: 7.35 [PH] (ref 7.32–7.43)
PH BLDV: 7.41 [PH] (ref 7.32–7.43)
PH UR STRIP: 6 [PH] (ref 5–8)
PLATELET # BLD: 144 K/UL (ref 130–400)
PLATELET # BLD: 146 K/UL (ref 130–400)
PMV BLD AUTO: 10.3 FL (ref 7.2–11.7)
PROT UR STRIP-MCNC: NEGATIVE MG/DL
RBC # BLD AUTO: 4.71 MIL/UL (ref 4.4–5.9)
RBC # BLD AUTO: 5.1 MIL/UL (ref 4.4–5.9)
RBC # UR STRIP: NEGATIVE /UL
SP GR UR STRIP: 1.01 (ref 1–1.03)
SQUAMOUS EPITHIAL: < 1 /HPF (ref 0–5)
URINE CLARITY: CLEAR
UROBILINOGEN UR-MCNC: 2 MG/DL (ref 0.2–1)
VENOUS BLOOD FIO2: 21 %
VENOUS BLOOD FIO2: 21 %
VENOUS BLOOD GAS PO2: 48 MM/HG (ref 30–55)
VENOUS BLOOD GAS PO2: 54 MM/HG (ref 30–55)
WBC # BLD AUTO: 11.7 K/UL (ref 4.8–10.8)
WBC # BLD AUTO: 9.7 K/UL (ref 4.8–10.8)

## 2019-01-30 RX ADMIN — WATER SCH MLS/HR: 1 INJECTION INTRAMUSCULAR; INTRAVENOUS; SUBCUTANEOUS at 21:14

## 2019-01-30 RX ADMIN — WATER SCH MLS/HR: 1 INJECTION INTRAMUSCULAR; INTRAVENOUS; SUBCUTANEOUS at 16:59

## 2019-01-30 RX ADMIN — WATER SCH MLS/HR: 1 INJECTION INTRAMUSCULAR; INTRAVENOUS; SUBCUTANEOUS at 11:10

## 2019-01-30 RX ADMIN — WATER SCH MLS/HR: 1 INJECTION INTRAMUSCULAR; INTRAVENOUS; SUBCUTANEOUS at 04:11

## 2019-01-30 NOTE — CP.PCM.HP
<Wallace Butts - Last Filed: 01/30/19 04:06>





History of Present Illness





- History of Present Illness


History of Present Illness: 





27 y/o M with NO PMHx presented to ED due to fever and R forearm abscess. Pt 

explains noticing a pimple on R forearm 8 days ago, pruritic, pt scratched it 

intermittently, was prgressively increasing in size and a few days ago it became

a small mass with white coloration. Pt was seen in the ED yesterday, was 

discharged ~2 AM with PO Cefazolin and PO Bactrim. However, pt felt feverish at 

10pm last night, and noticed that redness around lesion extended significantly 

since yesterday, which prompted him to come to ED today. Pt has also been taking

Motrin for fever and pain with no much improvement.  


--Pt reports similar episodes since childhood. Pt had a small abscess on R 3rd 

finger on June last year which did not improve with PO and topical antibiotics, 

and his mother had to rustically incise it in order for abscess to resolve. Pt 

reports a similar episode on R plantar foot area >1 year ago, for which PO 

antibiotics and topical antifungal were prescribed. Mother also incised and 

resolved after that.








PMD: None





NKDA


Meds: None 





-PMHx: denied


-PSHx: denied


-FHx: NC. Parents are healthy. 


-SHx: Pt smokes 5 cigarettes per week, alcohol socially, and no recreational 

drug use. 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 








At ED: 


--Vital signs WNL except for Temperature 100.5 F. 


--CBC showed mild leukocytosis, WBC 11.7-high.


--CMP unremarkable, VBG showed NO lactic acidosis, U/A wnl.  


--EKG: normal, read by me. CXR unremarkable, read by me


--PO Acetaminophen, IV NSS bolus x1 and IV Vancomycin were administered. 


--Pending Blood Cx, Urine Cx








Present on Admission





- Present on Admission


Any Indicators Present on Admission: No





Review of Systems





- Constitutional


Constitutional: Fever.  absent: Night Sweats, Weight Loss





- EENT


Nose/Mouth/Throat: absent: Nasal Congestion, Odynophagia, Sore Throat, Facial 

Pain





- Cardiovascular


Cardiovascular: absent: Acrocyanosis, Chest Pain, Claudication, Dyspnea





- Respiratory


Respiratory: absent: Cough, Dyspnea, Hemoptysis, Wheezing





- Gastrointestinal


Gastrointestinal: absent: Abdominal Pain, Diarrhea, Hematemesis, Nausea, 

Vomiting





- Genitourinary


Genitourinary: absent: Dysuria, Flank Pain, Urinary Frequency





Past Patient History





- Infectious Disease


Hx of Infectious Diseases: None





- Past Social History


Smoking Status: Light Smoker < 10 Cigarettes Daily





- PSYCHIATRIC


Hx Substance Use: No





- SURGICAL HISTORY


Hx Surgeries: No





- ANESTHESIA


Hx Anesthesia: No


Hx Anesthesia Reactions: No





Meds


Allergies/Adverse Reactions: 


                                    Allergies











Allergy/AdvReac Type Severity Reaction Status Date / Time


 


No Known Allergies Allergy   Verified 01/29/19 23:19














Physical Exam





- Constitutional


Appears: No Acute Distress





- Head Exam


Head Exam: ATRAUMATIC, NORMAL INSPECTION





- Eye Exam


Eye Exam: EOMI, Normal appearance





- ENT Exam


ENT Exam: Mucous Membranes Moist





- Neck Exam


Neck exam: Positive for: Full Rom, Normal Inspection.  Negative for: Meningismus





- Respiratory Exam


Respiratory Exam: NORMAL BREATHING PATTERN.  absent: Rhonchi, Wheezes, 

Respiratory Distress





- Cardiovascular Exam


Cardiovascular Exam: REGULAR RHYTHM, +S1, +S2





- GI/Abdominal Exam


GI & Abdominal Exam: Soft.  absent: Distended, Rebound, Rigid, Tenderness





- Extremities Exam


Extremities exam: Positive for: full ROM.  Negative for: calf tenderness, pedal 

edema





- Neurological Exam


Neurological exam: Alert, Oriented x3





- Skin


Additional comments: 





Right Upper extremity: Presence of circular open wound of ~3-4cm diameter, 

abscess with white discoloration encompassing and underneath wound, tender 

erythematous induration extending from R elbow to passing mid forearm.. 








Results





- Vital Signs


Recent Vital Signs: 





                                Last Vital Signs











Temp  100.5 F H  01/29/19 23:19


 


Pulse  94 H  01/29/19 23:19


 


Resp  16   01/29/19 23:19


 


BP  115/71   01/29/19 23:19


 


Pulse Ox  100   01/30/19 00:48














- Labs


Result Diagrams: 


                                 01/30/19 01:00





                                 01/30/19 01:00


Labs: 





                         Laboratory Results - last 24 hr











  01/30/19 01/30/19 01/30/19





  01:00 01:00 01:00


 


WBC  11.7 H  


 


RBC  5.10  


 


Hgb  14.5  


 


Hct  44.1  


 


MCV  86.5  


 


MCH  28.5  


 


MCHC  33.0  


 


RDW  13.9  


 


Plt Count  146  


 


MPV  10.3  


 


Neut % (Auto)  74.3  


 


Lymph % (Auto)  10.5 L  


 


Mono % (Auto)  8.5  


 


Eos % (Auto)  6.2 H  


 


Baso % (Auto)  0.5  


 


Neut # (Auto)  8.7 H  


 


Lymph # (Auto)  1.2  


 


Mono # (Auto)  1.0 H  


 


Eos # (Auto)  0.7  


 


Baso # (Auto)  0.1  


 


pO2   


 


VBG pH   


 


VBG pCO2   


 


VBG HCO3   


 


VBG Total CO2   


 


VBG O2 Sat (Calc)   


 


VBG Base Excess   


 


VBG Potassium   


 


Glucose   


 


Lactate   


 


FiO2   


 


Sodium   138 


 


Potassium   3.9 


 


Chloride   100 


 


Carbon Dioxide   24 


 


Anion Gap   18 


 


BUN   12 


 


Creatinine   0.9 


 


Est GFR ( Amer)   > 60 


 


Est GFR (Non-Af Amer)   > 60 


 


Random Glucose   136 H 


 


Calcium   9.7 


 


Total Bilirubin   1.7 H 


 


AST   25 


 


ALT   24 


 


Alkaline Phosphatase   81 


 


Total Protein   7.7 


 


Albumin   4.3 


 


Globulin   3.4 


 


Albumin/Globulin Ratio   1.3 


 


Venous Blood Potassium   


 


Urine Color    Yellow


 


Urine Clarity    Clear


 


Urine pH    6.0


 


Ur Specific Gravity    1.009


 


Urine Protein    Negative


 


Urine Glucose (UA)    Neg


 


Urine Ketones    Negative


 


Urine Blood    Negative


 


Urine Nitrate    Negative


 


Urine Bilirubin    Negative


 


Urine Urobilinogen    2.0


 


Ur Leukocyte Esterase    Neg


 


Urine RBC (Auto)    1


 


Urine Microscopic WBC    < 1


 


Ur Squamous Epith Cells    < 1


 


Urine Bacteria    Rare














  01/30/19





  01:10


 


WBC 


 


RBC 


 


Hgb 


 


Hct 


 


MCV 


 


MCH 


 


MCHC 


 


RDW 


 


Plt Count 


 


MPV 


 


Neut % (Auto) 


 


Lymph % (Auto) 


 


Mono % (Auto) 


 


Eos % (Auto) 


 


Baso % (Auto) 


 


Neut # (Auto) 


 


Lymph # (Auto) 


 


Mono # (Auto) 


 


Eos # (Auto) 


 


Baso # (Auto) 


 


pO2  54


 


VBG pH  7.41


 


VBG pCO2  38 L


 


VBG HCO3  24.4


 


VBG Total CO2  25.3


 


VBG O2 Sat (Calc)  92.3 H


 


VBG Base Excess  -0.3 L


 


VBG Potassium  3.7


 


Glucose  132 H


 


Lactate  1.2


 


FiO2  21.0


 


Sodium  134.0


 


Potassium 


 


Chloride  104.0


 


Carbon Dioxide 


 


Anion Gap 


 


BUN 


 


Creatinine 


 


Est GFR ( Amer) 


 


Est GFR (Non-Af Amer) 


 


Random Glucose 


 


Calcium 


 


Total Bilirubin 


 


AST 


 


ALT 


 


Alkaline Phosphatase 


 


Total Protein 


 


Albumin 


 


Globulin 


 


Albumin/Globulin Ratio 


 


Venous Blood Potassium  3.7


 


Urine Color 


 


Urine Clarity 


 


Urine pH 


 


Ur Specific Gravity 


 


Urine Protein 


 


Urine Glucose (UA) 


 


Urine Ketones 


 


Urine Blood 


 


Urine Nitrate 


 


Urine Bilirubin 


 


Urine Urobilinogen 


 


Ur Leukocyte Esterase 


 


Urine RBC (Auto) 


 


Urine Microscopic WBC 


 


Ur Squamous Epith Cells 


 


Urine Bacteria 














Assessment & Plan





- Assessment and Plan (Free Text)


Assessment: 





27 y/o M with NO PMHx was admitted for evaluation and management of R forearm 

abscess.





PLAN:





>Right forearm abscess


--Mild fever (100.5F) at presentation, mild leukocytosis (WBC 11.7)


--General Surgery consult, Dr Reyna


--Wound Culture ordered.


--IV Vancomycin and IV Zosyn ordered


--PO Tylenol for fever and PO Ibuprofen for pain. 


--Lesion was demarcated with a marker. 


--F/U AM labs, Blood Culture and Urine Culture. 





>Tobacco Use/Smoking Hx


--Pt was educated on the risk and complications from tobacco use. 


--Pt was extensively counseled on smoking cessation and its benefits. 


--Pt reported understanding and will reflect and meditate on the decision for st

opping tobacco. 





>DVT Prophylaxis


--Low risk


--SCD's PRN


--Ambulation encouraged








Case discussed with Dr Ellen Butts PGY-2








- Date & Time


Date: 01/30/19


Time: 04:00





<Mariano Hughes - Last Filed: 01/30/19 05:19>





Results





- Vital Signs


Recent Vital Signs: 





                                Last Vital Signs











Temp  98.3 F   01/30/19 03:51


 


Pulse  63   01/30/19 03:51


 


Resp  16   01/29/19 23:19


 


BP  109/65   01/30/19 03:51


 


Pulse Ox  100   01/30/19 04:06














- Labs


Result Diagrams: 


                                 01/30/19 01:00





                                 01/30/19 01:00


Labs: 





                         Laboratory Results - last 24 hr











  01/30/19 01/30/19 01/30/19





  01:00 01:00 01:00


 


WBC  11.7 H  


 


RBC  5.10  


 


Hgb  14.5  


 


Hct  44.1  


 


MCV  86.5  


 


MCH  28.5  


 


MCHC  33.0  


 


RDW  13.9  


 


Plt Count  146  


 


MPV  10.3  


 


Neut % (Auto)  74.3  


 


Lymph % (Auto)  10.5 L  


 


Mono % (Auto)  8.5  


 


Eos % (Auto)  6.2 H  


 


Baso % (Auto)  0.5  


 


Neut # (Auto)  8.7 H  


 


Lymph # (Auto)  1.2  


 


Mono # (Auto)  1.0 H  


 


Eos # (Auto)  0.7  


 


Baso # (Auto)  0.1  


 


pO2   


 


VBG pH   


 


VBG pCO2   


 


VBG HCO3   


 


VBG Total CO2   


 


VBG O2 Sat (Calc)   


 


VBG Base Excess   


 


VBG Potassium   


 


Glucose   


 


Lactate   


 


FiO2   


 


Sodium   138 


 


Potassium   3.9 


 


Chloride   100 


 


Carbon Dioxide   24 


 


Anion Gap   18 


 


BUN   12 


 


Creatinine   0.9 


 


Est GFR ( Amer)   > 60 


 


Est GFR (Non-Af Amer)   > 60 


 


Random Glucose   136 H 


 


Calcium   9.7 


 


Total Bilirubin   1.7 H 


 


AST   25 


 


ALT   24 


 


Alkaline Phosphatase   81 


 


Total Protein   7.7 


 


Albumin   4.3 


 


Globulin   3.4 


 


Albumin/Globulin Ratio   1.3 


 


Venous Blood Potassium   


 


Urine Color    Yellow


 


Urine Clarity    Clear


 


Urine pH    6.0


 


Ur Specific Gravity    1.009


 


Urine Protein    Negative


 


Urine Glucose (UA)    Neg


 


Urine Ketones    Negative


 


Urine Blood    Negative


 


Urine Nitrate    Negative


 


Urine Bilirubin    Negative


 


Urine Urobilinogen    2.0


 


Ur Leukocyte Esterase    Neg


 


Urine RBC (Auto)    1


 


Urine Microscopic WBC    < 1


 


Ur Squamous Epith Cells    < 1


 


Urine Bacteria    Rare














  01/30/19 01/30/19





  01:10 04:15


 


WBC  


 


RBC  


 


Hgb  


 


Hct  


 


MCV  


 


MCH  


 


MCHC  


 


RDW  


 


Plt Count  


 


MPV  


 


Neut % (Auto)  


 


Lymph % (Auto)  


 


Mono % (Auto)  


 


Eos % (Auto)  


 


Baso % (Auto)  


 


Neut # (Auto)  


 


Lymph # (Auto)  


 


Mono # (Auto)  


 


Eos # (Auto)  


 


Baso # (Auto)  


 


pO2  54  48


 


VBG pH  7.41  7.35


 


VBG pCO2  38 L  41


 


VBG HCO3  24.4  22.2


 


VBG Total CO2  25.3  23.9


 


VBG O2 Sat (Calc)  92.3 H  87.2 H


 


VBG Base Excess  -0.3 L  -2.9 L


 


VBG Potassium  3.7  3.9


 


Glucose  132 H  103


 


Lactate  1.2  0.8


 


FiO2  21.0  21.0


 


Sodium  134.0  135.0


 


Potassium  


 


Chloride  104.0  107.0


 


Carbon Dioxide  


 


Anion Gap  


 


BUN  


 


Creatinine  


 


Est GFR ( Amer)  


 


Est GFR (Non-Af Amer)  


 


Random Glucose  


 


Calcium  


 


Total Bilirubin  


 


AST  


 


ALT  


 


Alkaline Phosphatase  


 


Total Protein  


 


Albumin  


 


Globulin  


 


Albumin/Globulin Ratio  


 


Venous Blood Potassium  3.7  3.9


 


Urine Color  


 


Urine Clarity  


 


Urine pH  


 


Ur Specific Gravity  


 


Urine Protein  


 


Urine Glucose (UA)  


 


Urine Ketones  


 


Urine Blood  


 


Urine Nitrate  


 


Urine Bilirubin  


 


Urine Urobilinogen  


 


Ur Leukocyte Esterase  


 


Urine RBC (Auto)  


 


Urine Microscopic WBC  


 


Ur Squamous Epith Cells  


 


Urine Bacteria  














Attending/Attestation





- Attestation


I have personally seen and examined this patient.: Yes


I have fully participated in the care of the patient.: Yes


I have reviewed all pertinent clinical information: Yes


Notes (Text): 





01/30/19 05:11


Abscess of right upper extremity


send blood and wound culture


Gen Surgery consult for possible I+D


Start pt on Vanco and Zosyn


Send LA STAT


Tobacco cessation counseling

## 2019-01-30 NOTE — RAD
Date of service: 



01/30/2019



HISTORY:

 Sepsis Patient 



COMPARISON:

No prior. 



FINDINGS:



LUNGS:

No active pulmonary disease.



PLEURA:

No significant pleural effusion identified, no pneumothorax apparent.



CARDIOVASCULAR:

No aortic atherosclerotic calcification present.



Normal cardiac size. No pulmonary vascular congestion. 



OSSEOUS STRUCTURES:

No significant abnormalities.



VISUALIZED UPPER ABDOMEN:

Normal.



OTHER FINDINGS:

None.



IMPRESSION:

No active disease.

## 2019-01-30 NOTE — CARD
--------------- APPROVED REPORT --------------





Date of service: 01/30/2019



EKG Measurement

Heart [id number]

VT [id number]

[id number]

[id number]

[id number]



<Conclusion>

Normal sinus rhythm

Normal ECG

## 2019-01-30 NOTE — ED PDOC
Upper Extremity Pain/Injury


Time Seen by Provider: 01/30/19 00:10


Chief Complaint (Nursing): Abnormal Skin Integrity


Chief Complaint (Provider): Abnormal Skin Integrity


History Per: Patient


History/Exam Limitations: no limitations


Onset/Duration Of Symptoms: Days (x2)


Additional Complaint(s): 





29 y/o male with no significant PMHx returns to the ED for evaluation of 

abscess. Patient states he has been taking bactrim since the 28th and received 

an IV dose of antibiotics yesterday in this ER. Patient states that all day 

today the redness has spread down his arm and up his arm to the mid bicep and 

mid forearm. Patient additionally reports that his arm has become stiff and hard

to bend. Patient also states he had a fever today and took Tylenol at home.





Past Medical History


Reviewed: Historical Data, Nursing Documentation, Vital Signs


Vital Signs: 





                                Last Vital Signs











Temp  100.5 F H  01/29/19 23:19


 


Pulse  94 H  01/29/19 23:19


 


Resp  16   01/29/19 23:19


 


BP  115/71   01/29/19 23:19


 


Pulse Ox  100   01/29/19 23:19














- Medical History


PMH: No Chronic Diseases





- Surgical History


Surgical History: No Surg Hx





- Family History


Family History: States: Unknown Family Hx





- Immunization History


Hx Tetanus Toxoid Vaccination: Yes


Hx Influenza Vaccination: No


Hx Pneumococcal Vaccination: No





- Home Medications


Home Medications: 


                                Ambulatory Orders











 Medication  Instructions  Recorded


 


Cephalexin [cephalexin] 500 mg PO Q6 #28 cap 01/28/19


 


Sulfamethoxazole/Trimethoprim 2 tab PO BID #28 tab 01/28/19





[Bactrim  mg-160 mg]  














- Allergies


Allergies/Adverse Reactions: 


                                    Allergies











Allergy/AdvReac Type Severity Reaction Status Date / Time


 


No Known Allergies Allergy   Verified 01/29/19 23:19














Review of Systems


ROS Statement: Except As Marked, All Systems Reviewed And Found Negative


Constitutional: Positive for: Fever


Musculoskeletal: Positive for: Arm Pain


Skin: Positive for: Other (Abscess)





Physical Exam





- Reviewed


Nursing Documentation Reviewed: Yes


Vital Signs Reviewed: Yes





- Physical Exam


Appears: Positive for: Well, Non-toxic, No Acute Distress


Head Exam: Positive for: ATRAUMATIC, NORMAL INSPECTION, NORMOCEPHALIC


Skin: Positive for: Normal Color, Warm, Dry


Eye Exam: Positive for: EOMI, Normal appearance, PERRL


Neck: Positive for: Normal, Painless ROM


Cardiovascular/Chest: Positive for: Regular Rate, Rhythm.  Negative for: Murmur


Respiratory: Positive for: Normal Breath Sounds.  Negative for: Wheezing


Extremity: Positive for: Normal ROM (near full ROM of RUE), Swelling (general 

swelling to RUE including hand), Other (2 cm x 2 cm indurated abscess that is 

opened and draining on the dorsal surface of the forearm; abscess is surrounded 

by erythema from mid bicep to mid forearm)


Neurologic/Psych: Positive for: Alert, Oriented.  Negative for: Motor/Sensory 

Deficits





- Laboratory Results


Result Diagrams: 


                                 01/30/19 01:00





                                 01/30/19 01:00





- ECG


O2 Sat by Pulse Oximetry: 100 (RA)


Pulse Ox Interpretation: Normal





Medical Decision Making


Medical Decision Making: 





Time: 00:21


A/P: 29 y/o male with worsening abscess and cellulitis that is failing 

outpatient treatment.


* VBG


* EKG


* CMP


* CBC w/ diff


* CXR


* IV Fluids


* Acetaminophen 975 mg


* Vancomycin 


* Blood culture


* Urine culture


* UA





230AM


Surgery consulted for abscess


Spoke with hospitalist who agrees to admit patient 





----------

--------------------------------------------------------------------------------


-------


Scribe Attestation:


Documented by Duglas Wylie acting as a scribe for Royal Lopez MD.





Provider Scribe Attestation:


All medical record entries made by the Scribe were at my direction and 

personally dictated by me. I have reviewed the chart and agree that the record 

accurately reflects my personal performance of the history, physical exam, 

medical decision making, and the department course for this patient. I have also

personally directed, reviewed, and agree with the discharge instructions and 

disposition.





Disposition





- Clinical Impression


Clinical Impression: 


 Abscess, Cellulitis








- Patient ED Disposition


Is Patient to be Admitted: Yes





- Disposition


Disposition Time: 02:30


Condition: FAIR


Forms:  [employer] (English)

## 2019-01-30 NOTE — CP.PCM.CON
History of Present Illness





- History of Present Illness


History of Present Illness: 





SURGERY CONSULT NOTE FOR DR. PALACIOS





Reason: right forearm swelling





28M presents to the ER for right arm pain and swelling. Patient began developing

these symptoms approximately 8 days ago. It is located in the right forearm, and

is is associated with erythema. Patient does admit to fevers, denies chills. On 

the posterior proximal forearm, an area of purulent drainage has developed. He 

denies pain in the right hand, denies numbness, tingling, denies decrease 

sensation, denies motor dysfunction.





PMH: right 3rd finger abscess, right foot abscess


PSH: denies (mother drained both abscesses in the past)


Social: admits to prior use of tobacco, admits social alcohol use, denies 

illicit drug use


Allergies: denies 





Past Patient History





- Infectious Disease


Hx of Infectious Diseases: None





- Past Social History


Smoking Status: Light Smoker < 10 Cigarettes Daily





- PSYCHIATRIC


Hx Substance Use: No





- SURGICAL HISTORY


Hx Surgeries: No





- ANESTHESIA


Hx Anesthesia: No


Hx Anesthesia Reactions: No





Meds


Allergies/Adverse Reactions: 


                                    Allergies











Allergy/AdvReac Type Severity Reaction Status Date / Time


 


No Known Allergies Allergy   Verified 01/29/19 23:19














- Medications


Medications: 


                               Current Medications





Acetaminophen (Tylenol 325mg Tab)  650 mg PO Q4 PRN


   PRN Reason: Fever >100.4 F


Vancomycin HCl 1 gm/ Sodium (Chloride)  250 mls @ 166.667 mls/hr IVPB Q12H [facility]; 

Protocol


Piperacillin Sod/Tazobactam (Sod 3.375 gm/ Sodium Chloride)  100 mls @ 100 

mls/hr IVPB Q6 [facility]; Protocol


   Last Admin: 01/30/19 04:11 Dose:  100 mls/hr


Ibuprofen (Motrin Tab)  400 mg PO Q6 PRN


   PRN Reason: Pain, moderate (4-7)











Physical Exam





- Constitutional


Appears: Non-toxic, Toxic





- Eye Exam


Eye Exam: EOMI, PERRL





- Respiratory Exam


Respiratory Exam: Clear to Auscultation Bilateral, NORMAL BREATHING PATTERN





- Cardiovascular Exam


Cardiovascular Exam: REGULAR RHYTHM, +S1, +S2





- GI/Abdominal Exam


GI & Abdominal Exam: Soft.  absent: Distended, Firm, Rebound, Rigid, Tenderness





- Extremities Exam


Additional comments: 





right forearm swelling, tenderness on palpation, erythema that spans the 

forearm, draining in the posterior proximal forearm





- Neurological Exam


Neurological exam: Alert, Oriented x3





- Psychiatric Exam


Psychiatric exam: Normal Affect, Normal Mood





- Skin


Skin Exam: Dry, Intact, Warm





Results





- Vital Signs


Recent Vital Signs: 


                                Last Vital Signs











Temp  98.3 F   01/30/19 03:51


 


Pulse  63   01/30/19 03:51


 


Resp  16   01/29/19 23:19


 


BP  109/65   01/30/19 03:51


 


Pulse Ox  100   01/30/19 04:06














- Labs


Result Diagrams: 


                                 01/30/19 05:45





                                 01/30/19 01:00


Labs: 


                         Laboratory Results - last 24 hr











  01/30/19 01/30/19 01/30/19





  01:00 01:00 01:00


 


WBC  11.7 H  


 


RBC  5.10  


 


Hgb  14.5  


 


Hct  44.1  


 


MCV  86.5  


 


MCH  28.5  


 


MCHC  33.0  


 


RDW  13.9  


 


Plt Count  146  


 


MPV  10.3  


 


Neut % (Auto)  74.3  


 


Lymph % (Auto)  10.5 L  


 


Mono % (Auto)  8.5  


 


Eos % (Auto)  6.2 H  


 


Baso % (Auto)  0.5  


 


Neut # (Auto)  8.7 H  


 


Lymph # (Auto)  1.2  


 


Mono # (Auto)  1.0 H  


 


Eos # (Auto)  0.7  


 


Baso # (Auto)  0.1  


 


pO2   


 


VBG pH   


 


VBG pCO2   


 


VBG HCO3   


 


VBG Total CO2   


 


VBG O2 Sat (Calc)   


 


VBG Base Excess   


 


VBG Potassium   


 


Glucose   


 


Lactate   


 


FiO2   


 


Sodium   138 


 


Potassium   3.9 


 


Chloride   100 


 


Carbon Dioxide   24 


 


Anion Gap   18 


 


BUN   12 


 


Creatinine   0.9 


 


Est GFR ( Amer)   > 60 


 


Est GFR (Non-Af Amer)   > 60 


 


Random Glucose   136 H 


 


Calcium   9.7 


 


Total Bilirubin   1.7 H 


 


AST   25 


 


ALT   24 


 


Alkaline Phosphatase   81 


 


Total Protein   7.7 


 


Albumin   4.3 


 


Globulin   3.4 


 


Albumin/Globulin Ratio   1.3 


 


Venous Blood Potassium   


 


Urine Color    Yellow


 


Urine Clarity    Clear


 


Urine pH    6.0


 


Ur Specific Gravity    1.009


 


Urine Protein    Negative


 


Urine Glucose (UA)    Neg


 


Urine Ketones    Negative


 


Urine Blood    Negative


 


Urine Nitrate    Negative


 


Urine Bilirubin    Negative


 


Urine Urobilinogen    2.0


 


Ur Leukocyte Esterase    Neg


 


Urine RBC (Auto)    1


 


Urine Microscopic WBC    < 1


 


Ur Squamous Epith Cells    < 1


 


Urine Bacteria    Rare














  01/30/19 01/30/19





  01:10 04:15


 


WBC  


 


RBC  


 


Hgb  


 


Hct  


 


MCV  


 


MCH  


 


MCHC  


 


RDW  


 


Plt Count  


 


MPV  


 


Neut % (Auto)  


 


Lymph % (Auto)  


 


Mono % (Auto)  


 


Eos % (Auto)  


 


Baso % (Auto)  


 


Neut # (Auto)  


 


Lymph # (Auto)  


 


Mono # (Auto)  


 


Eos # (Auto)  


 


Baso # (Auto)  


 


pO2  54  48


 


VBG pH  7.41  7.35


 


VBG pCO2  38 L  41


 


VBG HCO3  24.4  22.2


 


VBG Total CO2  25.3  23.9


 


VBG O2 Sat (Calc)  92.3 H  87.2 H


 


VBG Base Excess  -0.3 L  -2.9 L


 


VBG Potassium  3.7  3.9


 


Glucose  132 H  103


 


Lactate  1.2  0.8


 


FiO2  21.0  21.0


 


Sodium  134.0  135.0


 


Potassium  


 


Chloride  104.0  107.0


 


Carbon Dioxide  


 


Anion Gap  


 


BUN  


 


Creatinine  


 


Est GFR ( Amer)  


 


Est GFR (Non-Af Amer)  


 


Random Glucose  


 


Calcium  


 


Total Bilirubin  


 


AST  


 


ALT  


 


Alkaline Phosphatase  


 


Total Protein  


 


Albumin  


 


Globulin  


 


Albumin/Globulin Ratio  


 


Venous Blood Potassium  3.7  3.9


 


Urine Color  


 


Urine Clarity  


 


Urine pH  


 


Ur Specific Gravity  


 


Urine Protein  


 


Urine Glucose (UA)  


 


Urine Ketones  


 


Urine Blood  


 


Urine Nitrate  


 


Urine Bilirubin  


 


Urine Urobilinogen  


 


Ur Leukocyte Esterase  


 


Urine RBC (Auto)  


 


Urine Microscopic WBC  


 


Ur Squamous Epith Cells  


 


Urine Bacteria  














Assessment & Plan





- Assessment and Plan (Free Text)


Assessment: 





28M with right forearm cellulitis with small area of spontaneously draining 

abscess


Plan: 





- IVF


- Warm compresses to forearm multiple times daily


- IV antibiotics


- Monitor area of erythema


- Monitor vitals


- Monitor WBC


Further recs discuss with Dr. Axel Chacon, PGY3

## 2019-01-30 NOTE — CP.PCM.PN
Subjective





- Date & Time of Evaluation


Date of Evaluation: 01/30/19


Time of Evaluation: 08:00





- Subjective


Subjective: 


 PT seen and examined this morning. Reports that right arm pain and swelling has

improved. Yellow purulent drainage noted coming from center of abscess. 

Discussed plan with pt. As per surgery, will apply warm compresses and continue 

IV ABx. 








Objective





- Vital Signs/Intake and Output


Vital Signs (last 24 hours): 


                                        











Temp Pulse Resp BP Pulse Ox


 


 98 F   86   18   113/63   100 


 


 01/30/19 09:36  01/30/19 08:59  01/30/19 08:59  01/30/19 08:59  01/30/19 08:59











- Medications


Medications: 


                               Current Medications





Acetaminophen (Tylenol 325mg Tab)  650 mg PO Q4 PRN


   PRN Reason: Fever >100.4 F


Vancomycin HCl 1 gm/ Sodium (Chloride)  250 mls @ 166.667 mls/hr IVPB Q12H [facility]; 

Protocol


   Last Admin: 01/30/19 12:25 Dose:  166.667 mls/hr


Piperacillin Sod/Tazobactam (Sod 3.375 gm/ Sodium Chloride)  100 mls @ 100 

mls/hr IVPB Q6 [facility]; Protocol


   Last Admin: 01/30/19 11:10 Dose:  100 mls/hr


Ibuprofen (Motrin Tab)  400 mg PO Q6 PRN


   PRN Reason: Pain, moderate (4-7)


   Last Admin: 01/30/19 09:36 Dose:  400 mg











- Labs


Labs: 


                                        





                                 01/30/19 05:45 





                                 01/30/19 01:00 











- Constitutional


Appears: No Acute Distress





- Head Exam


Head Exam: NORMAL INSPECTION





- Eye Exam


Eye Exam: Normal appearance





- ENT Exam


ENT Exam: Mucous Membranes Moist





- Respiratory Exam


Respiratory Exam: Clear to Ausculation Bilateral.  absent: Rales, Wheezes





- Cardiovascular Exam


Cardiovascular Exam: REGULAR RHYTHM, +S1, +S2





- GI/Abdominal Exam


GI & Abdominal Exam: Normal Bowel Sounds





- Extremities Exam


Additional comments: 


 Right lateral forearm- 3-4 cm swelling with erythematous base, central lesion 

with yellow purulent drainage








Assessment and Plan





- Assessment and Plan (Free Text)


Assessment: 








29 y/o M with NO PMHx was admitted for evaluation and management of R forearm 

abscess.





PLAN:





>Right forearm abscess


--Clinically improving


--Afebrile in last 24 hours, Leukocytosis resolved


--General Surgery consult, Dr Reyna- Warm Compress TID


--Wound Culture ordered


--C/W IV Vancomycin and IV Zosyn 


--PO Tylenol for fever and PO Ibuprofen for pain. 


--Lesion was demarcated with a marker. 


--F/U AM labs, Blood Culture and Urine Culture. 





>Tobacco Use/Smoking Hx


--Pt was educated on the risk and complications from tobacco use. 


--Pt was extensively counseled on smoking cessation and its benefits. 


--Pt reported understanding and will reflect and meditate on the decision for 

stopping tobacco. 





>DVT Prophylaxis


--Low risk


--SCD's PRN


--Ambulation encouraged








Discussed with Dr. Cruz

## 2019-01-31 VITALS
DIASTOLIC BLOOD PRESSURE: 65 MMHG | RESPIRATION RATE: 18 BRPM | SYSTOLIC BLOOD PRESSURE: 119 MMHG | HEART RATE: 60 BPM | TEMPERATURE: 98 F | OXYGEN SATURATION: 96 %

## 2019-01-31 LAB
BASOPHILS # BLD AUTO: 0 K/UL (ref 0–0.2)
BASOPHILS NFR BLD: 0.4 % (ref 0–2)
EOSINOPHIL # BLD AUTO: 1.1 K/UL (ref 0–0.7)
EOSINOPHIL NFR BLD: 15.1 % (ref 0–4)
ERYTHROCYTE [DISTWIDTH] IN BLOOD BY AUTOMATED COUNT: 13.6 % (ref 11.5–14.5)
HGB BLD-MCNC: 14.4 G/DL (ref 12–18)
LYMPHOCYTES # BLD AUTO: 1.5 K/UL (ref 1–4.3)
LYMPHOCYTES NFR BLD AUTO: 20.7 % (ref 20–40)
MCH RBC QN AUTO: 29.3 PG (ref 27–31)
MCHC RBC AUTO-ENTMCNC: 33.8 G/DL (ref 33–37)
MCV RBC AUTO: 86.6 FL (ref 80–94)
MONOCYTES # BLD: 0.5 K/UL (ref 0–0.8)
MONOCYTES NFR BLD: 7.3 % (ref 0–10)
NEUTROPHILS # BLD: 4.2 K/UL (ref 1.8–7)
NEUTROPHILS NFR BLD AUTO: 56.5 % (ref 50–75)
NRBC BLD AUTO-RTO: 0 % (ref 0–0)
PLATELET # BLD: 155 K/UL (ref 130–400)
PMV BLD AUTO: 9.8 FL (ref 7.2–11.7)
RBC # BLD AUTO: 4.92 MIL/UL (ref 4.4–5.9)
WBC # BLD AUTO: 7.5 K/UL (ref 4.8–10.8)

## 2019-01-31 RX ADMIN — WATER SCH MLS/HR: 1 INJECTION INTRAMUSCULAR; INTRAVENOUS; SUBCUTANEOUS at 09:27

## 2019-01-31 RX ADMIN — WATER SCH MLS/HR: 1 INJECTION INTRAMUSCULAR; INTRAVENOUS; SUBCUTANEOUS at 16:49

## 2019-01-31 RX ADMIN — WATER SCH MLS/HR: 1 INJECTION INTRAMUSCULAR; INTRAVENOUS; SUBCUTANEOUS at 04:44

## 2019-01-31 NOTE — CP.PCM.DIS
Provider





- Provider


Date of Admission: 


01/30/19 02:35





Attending physician: 


Mariano Hughes MD





Consults: 








01/30/19 02:33


Surgery [General Surgery Consult] Stat 


   Comment: 


   Consulting Provider: Vladislav Cisneros


   Consulting Physician: Vladislav Cisneros


   Reason for Consult: abscess, cellulitis











Time Spent in preparation of Discharge (in minutes): 35





Diagnosis





- Discharge Diagnosis


(1) Cellulitis


Status: Resolved   





(2) Abscess


Status: Resolved   





Hospital Course





- Lab Results


Lab Results: 


                                  Micro Results





01/30/19 01:00   Urine Random   Urine Culture - Final


                            No Growth (<1,000 CFU/ML)


01/30/19 08:42   Abscess - Arm-Right   Gram Stain - Final


01/30/19 08:42   Abscess - Arm-Right   Wound Culture - Preliminary


                            Gram Positive Cocci


01/30/19 01:30   Blood   Blood Culture - Preliminary


                            NO GROWTH AFTER 24 HOURS


01/30/19 01:00   Blood   Blood Culture - Preliminary


                            NO GROWTH AFTER 24 HOURS





                             Most Recent Lab Values











WBC  7.5 K/uL (4.8-10.8)   01/31/19  08:40    


 


RBC  4.92 Mil/uL (4.40-5.90)   01/31/19  08:40    


 


Hgb  14.4 g/dL (12.0-18.0)   01/31/19  08:40    


 


Hct  42.6 % (35.0-51.0)   01/31/19  08:40    


 


MCV  86.6 fl (80.0-94.0)   01/31/19  08:40    


 


MCH  29.3 pg (27.0-31.0)   01/31/19  08:40    


 


MCHC  33.8 g/dL (33.0-37.0)   01/31/19  08:40    


 


RDW  13.6 % (11.5-14.5)   01/31/19  08:40    


 


Plt Count  155 K/uL (130-400)   01/31/19  08:40    


 


MPV  9.8 fl (7.2-11.7)   01/31/19  08:40    


 


Neut % (Auto)  56.5 % (50.0-75.0)   01/31/19  08:40    


 


Lymph % (Auto)  20.7 % (20.0-40.0)   01/31/19  08:40    


 


Mono % (Auto)  7.3 % (0.0-10.0)   01/31/19  08:40    


 


Eos % (Auto)  15.1 % (0.0-4.0)  H  01/31/19  08:40    


 


Baso % (Auto)  0.4 % (0.0-2.0)   01/31/19  08:40    


 


Neut # (Auto)  4.2 K/uL (1.8-7.0)   01/31/19  08:40    


 


Lymph # (Auto)  1.5 K/uL (1.0-4.3)   01/31/19  08:40    


 


Mono # (Auto)  0.5 K/uL (0.0-0.8)   01/31/19  08:40    


 


Eos # (Auto)  1.1 K/uL (0.0-0.7)  H  01/31/19  08:40    


 


Baso # (Auto)  0.0 K/uL (0.0-0.2)   01/31/19  08:40    


 


pO2  48 mm/Hg (30-55)   01/30/19  04:15    


 


VBG pH  7.35  (7.32-7.43)   01/30/19  04:15    


 


VBG pCO2  41 mmHg (40-60)   01/30/19  04:15    


 


VBG HCO3  22.2 mmol/L  01/30/19  04:15    


 


VBG Total CO2  23.9 mmol/L (22-28)   01/30/19  04:15    


 


VBG O2 Sat (Calc)  87.2 % (40-65)  H  01/30/19  04:15    


 


VBG Base Excess  -2.9 mmol/L (0.0-2.0)  L  01/30/19  04:15    


 


VBG Potassium  3.9 mmol/L (3.6-5.2)   01/30/19  04:15    


 


Sodium  135.0 mmol/L (132-148)   01/30/19  04:15    


 


Chloride  107.0 mmol/L ()   01/30/19  04:15    


 


Glucose  103 mg/dL ()   01/30/19  04:15    


 


Lactate  0.8 mmol/L (0.7-2.1)   01/30/19  04:15    


 


FiO2  21.0 %  01/30/19  04:15    


 


Sodium  138 mmol/l (132-148)   01/30/19  01:00    


 


Potassium  3.9 MMOL/L (3.6-5.0)   01/30/19  01:00    


 


Chloride  100 mmol/L ()   01/30/19  01:00    


 


Carbon Dioxide  24 mmol/L (22-30)   01/30/19  01:00    


 


Anion Gap  18  (10-20)   01/30/19  01:00    


 


BUN  12 mg/dl (9-20)   01/30/19  01:00    


 


Creatinine  0.9 mg/dl (0.8-1.5)   01/30/19  01:00    


 


Est GFR ( Amer)  > 60   01/30/19  01:00    


 


Est GFR (Non-Af Amer)  > 60   01/30/19  01:00    


 


Random Glucose  136 mg/dL ()  H  01/30/19  01:00    


 


Hemoglobin A1c  5.6 % (4.2-6.5)   01/30/19  05:45    


 


Calcium  9.7 mg/dL (8.4-10.2)   01/30/19  01:00    


 


Total Bilirubin  1.7 mg/dl (0.2-1.3)  H  01/30/19  01:00    


 


AST  25 U/L (17-59)   01/30/19  01:00    


 


ALT  24 U/L (21-72)   01/30/19  01:00    


 


Alkaline Phosphatase  81 U/L ()   01/30/19  01:00    


 


Total Protein  7.7 G/DL (6.3-8.2)   01/30/19  01:00    


 


Albumin  4.3 g/dL (3.5-5.0)   01/30/19  01:00    


 


Globulin  3.4 gm/dL (2.2-3.9)   01/30/19  01:00    


 


Albumin/Globulin Ratio  1.3  (1.0-2.1)   01/30/19  01:00    


 


Venous Blood Potassium  3.9 mmol/L (3.6-5.2)   01/30/19  04:15    


 


Urine Color  Yellow  (YELLOW)   01/30/19  01:00    


 


Urine Clarity  Clear  (Clear)   01/30/19  01:00    


 


Urine pH  6.0  (5.0-8.0)   01/30/19  01:00    


 


Ur Specific Gravity  1.009  (1.003-1.030)   01/30/19  01:00    


 


Urine Protein  Negative mg/dL (NEGATIVE)   01/30/19  01:00    


 


Urine Glucose (UA)  Neg mg/dL (NEGATIVE)   01/30/19  01:00    


 


Urine Ketones  Negative mg/dL (NEGATIVE)   01/30/19  01:00    


 


Urine Blood  Negative  (NEGATIVE)   01/30/19  01:00    


 


Urine Nitrate  Negative  (NEGATIVE)   01/30/19  01:00    


 


Urine Bilirubin  Negative  (NEGATIVE)   01/30/19  01:00    


 


Urine Urobilinogen  2.0 mg/dL (0.2-1.0)   01/30/19  01:00    


 


Ur Leukocyte Esterase  Neg Susan/uL (Negative)   01/30/19  01:00    


 


Urine RBC (Auto)  1 /hpf (0-3)   01/30/19  01:00    


 


Urine Microscopic WBC  < 1 /hpf (0-5)   01/30/19  01:00    


 


Ur Squamous Epith Cells  < 1 /hpf (0-5)   01/30/19  01:00    


 


Urine Bacteria  Rare  (<OCC)   01/30/19  01:00    














- Hospital Course


Hospital Course: 


27 y/o M with NO PMHx was admitted for evaluation and management of R forearm 

abscess.





PLAN:





>Right forearm abscess


--Clinically improved after 2 days of Vancomycin


--Afebrile in last 48 hours, Leukocytosis resolved


--General Surgery consult, Dr Cisneros- I&D today and packed. F/u in 1 week 


--Wound Culture-GPC, BCx negative


--C/W Oral Bsctrim and Keflex from prior ED visit; pt states he will complete 

course of tx


--Will follow up on 01/04 at 10:00am, appt with Dr. iglesias for wound check and re-

packing 





>Tobacco Use/Smoking Hx


--Pt was educated on the risk and complications from tobacco use. 


--Pt was extensively counseled on smoking cessation and its benefits. 


--Pt reported understanding and will reflect and meditate on the decision for 

stopping tobacco. 








Discussed with Dr. Cruz








Discharge Exam





- Head Exam


Head Exam: NORMAL INSPECTION





- Eye Exam


Eye Exam: Normal appearance





- ENT Exam


ENT Exam: Mucous Membranes Moist





- Respiratory Exam


Respiratory Exam: Clear to PA & Lateral.  absent: Rales, Wheezes





- Cardiovascular Exam


Cardiovascular Exam: REGULAR RHYTHM, +S1, +S2





- GI/Abdominal Exam


GI & Abdominal Exam: Normal Bowel Sounds, Soft.  absent: Tenderness





- Extremities Exam


Extremities exam: normal inspection


Additional comments: 


 Right forearm swealling, redness with active serosang discharge, packing in 

place. Good radial pulse, sensory and motor in tact








- Neurological Exam


Neurological exam: Alert, Oriented x3





- Psychiatric Exam


Psychiatric exam: Normal Affect





- Skin


Skin Exam: Normal Color





Discharge Plan





- Follow Up Plan


Condition: FAIR


Disposition: HOME/ ROUTINE


Instructions:  Cellulitis (Skin Infection), Adult (DC), Wound Incision and 

Drainage (DC), Abscess (GEN)


Additional Instructions: 


follow up with dr cisneros 1 week 


Referrals: 


[facility] [Outside]


Vladislav Cisneros MD [Staff Provider] -

## 2019-01-31 NOTE — CP.PCM.PN
Subjective





- Date & Time of Evaluation


Date of Evaluation: 19


Time of Evaluation: 14:32





- Subjective


Subjective: 





PRE-OP DIAGNOSIS: right forearm abscess


POST-OP DIAGNOSIS: right forearm abscess


PROCEDURE: incision and drainage of right forearm abscess


: TAB ChaconY3


 


PROCEDURE: 


A timeout protocol was performed prior to initiating the procedure.  The area 

was prepared and draped in the usual, sterile manner. The site was anesthetized 

with 1% lidocaine with epinephrine. A linear incision along the local skin lines

was made and the purulent/sanguinous material expressed. The abscess was 

explored thoroughly and sequestered pockets were opened. Bleeding controlled 

with packing.


Packin inch plain packing


 


Followup: The patient tolerated the procedure well without complications.





Objective





- Vital Signs/Intake and Output


Vital Signs (last 24 hours): 


                                        











Temp Pulse Resp BP Pulse Ox


 


 97.8 F   54 L  20   113/69   98 


 


 19 08:27  19 08:27  19 08:27  19 08:27  19 08:27











- Medications


Medications: 


                               Current Medications





Acetaminophen (Tylenol 325mg Tab)  650 mg PO Q4 PRN


   PRN Reason: Fever >100.4 F


Vancomycin HCl 1 gm/ Sodium (Chloride)  250 mls @ 166.667 mls/hr IVPB Q12H [facility]; 

Protocol


   Last Admin: 19 12:25 Dose:  166.667 mls/hr


Piperacillin Sod/Tazobactam (Sod 3.375 gm/ Sodium Chloride)  100 mls @ 100 

mls/hr IVPB Q6 G[facility]RET; Protocol


   Last Admin: 19 09:27 Dose:  100 mls/hr


Ibuprofen (Motrin Tab)  400 mg PO Q6 PRN


   PRN Reason: Pain, moderate (4-7)


   Last Admin: 19 10:10 Dose:  400 mg











- Labs


Labs: 


                                        





                                 19 08:40 





                                 19 01:00

## 2019-01-31 NOTE — CP.PCM.PN
Subjective





- Date & Time of Evaluation


Date of Evaluation: 01/31/19


Time of Evaluation: 08:00





- Subjective


Subjective: 


 PT seen and examined this morning. Reports that swelling and arm pain has 

improved. Denies fever/chills. Moving all digits, no numbness/tingling. 








Objective





- Vital Signs/Intake and Output


Vital Signs (last 24 hours): 


                                        











Temp Pulse Resp BP Pulse Ox


 


 97.8 F   54 L  20   113/69   98 


 


 01/31/19 08:27  01/31/19 08:27  01/31/19 08:27  01/31/19 08:27  01/31/19 08:27











- Medications


Medications: 


                               Current Medications





Acetaminophen (Tylenol 325mg Tab)  650 mg PO Q4 PRN


   PRN Reason: Fever >100.4 F


Vancomycin HCl 1 gm/ Sodium (Chloride)  250 mls @ 166.667 mls/hr IVPB Q12H [facility]; 

Protocol


   Last Admin: 01/31/19 12:25 Dose:  166.667 mls/hr


Piperacillin Sod/Tazobactam (Sod 3.375 gm/ Sodium Chloride)  100 mls @ 100 

mls/hr IVPB Q6 [facility]; Protocol


   Last Admin: 01/31/19 09:27 Dose:  100 mls/hr


Ibuprofen (Motrin Tab)  400 mg PO Q6 PRN


   PRN Reason: Pain, moderate (4-7)


   Last Admin: 01/31/19 10:10 Dose:  400 mg











- Labs


Labs: 


                                        





                                 01/31/19 08:40 





                                 01/30/19 01:00 











- Constitutional


Appears: No Acute Distress





- Head Exam


Head Exam: NORMAL INSPECTION





- Eye Exam


Eye Exam: Normal appearance





- ENT Exam


ENT Exam: Mucous Membranes Moist





- Neck Exam


Neck Exam: Full ROM





- Respiratory Exam


Respiratory Exam: Clear to Ausculation Bilateral.  absent: Rales, Wheezes





- Cardiovascular Exam


Cardiovascular Exam: REGULAR RHYTHM, +S1, +S2.  absent: Murmur





- GI/Abdominal Exam


GI & Abdominal Exam: Soft, Normal Bowel Sounds





- Extremities Exam


Extremities Exam: Normal Capillary Refill, Normal Inspection


Additional comments: 


 RIght lateral forarm- moderate swelling, redness, and tenderness improved from 

day before. Central ulceration, draining serosangionuous discharge. Good radial 

pulse, sensation and motor of all digits in tact. 








- Neurological Exam


Neurological Exam: Alert, Awake, Oriented x3





- Psychiatric Exam


Psychiatric exam: Normal Affect





- Skin


Skin Exam: Normal Color





Assessment and Plan





- Assessment and Plan (Free Text)


Assessment: 





27 y/o M with NO PMHx was admitted for evaluation and management of R forearm 

abscess.





PLAN:





>Right forearm abscess


--Clinically improving


--Afebrile in last 48 hours, Leukocytosis resolved


--General Surgery consult, Dr Reyna- Warm Compress TID,


--Wound Culture-GPC


--C/W IV Vancomycin and IV Zosyn 


--PO Tylenol for fever and PO Ibuprofen for pain. 


--F/U AM labs, Blood Culture and Urine Culture. 





>Tobacco Use/Smoking Hx


--Pt was educated on the risk and complications from tobacco use. 


--Pt was extensively counseled on smoking cessation and its benefits. 


--Pt reported understanding and will reflect and meditate on the decision for 

stopping tobacco. 





>DVT Prophylaxis


--Low risk


--SCD's PRN


--Ambulation encouraged








Discussed with Dr. Cruz
